# Patient Record
Sex: MALE | Race: WHITE | Employment: FULL TIME | URBAN - METROPOLITAN AREA
[De-identification: names, ages, dates, MRNs, and addresses within clinical notes are randomized per-mention and may not be internally consistent; named-entity substitution may affect disease eponyms.]

---

## 2017-09-09 ENCOUNTER — APPOINTMENT (OUTPATIENT)
Dept: GENERAL RADIOLOGY | Facility: CLINIC | Age: 47
End: 2017-09-09
Attending: EMERGENCY MEDICINE
Payer: COMMERCIAL

## 2017-09-09 ENCOUNTER — HOSPITAL ENCOUNTER (EMERGENCY)
Facility: CLINIC | Age: 47
Discharge: HOME | End: 2017-09-09
Attending: EMERGENCY MEDICINE | Admitting: EMERGENCY MEDICINE
Payer: COMMERCIAL

## 2017-09-09 VITALS
SYSTOLIC BLOOD PRESSURE: 110 MMHG | HEART RATE: 90 BPM | OXYGEN SATURATION: 98 % | DIASTOLIC BLOOD PRESSURE: 76 MMHG | TEMPERATURE: 97.7 F | RESPIRATION RATE: 20 BRPM

## 2017-09-09 DIAGNOSIS — R07.89 ATYPICAL CHEST PAIN: ICD-10-CM

## 2017-09-09 LAB
ANION GAP SERPL CALCULATED.3IONS-SCNC: 8 MMOL/L (ref 3–14)
BUN SERPL-MCNC: 18 MG/DL (ref 7–30)
CALCIUM SERPL-MCNC: 8.8 MG/DL (ref 8.5–10.1)
CHLORIDE SERPL-SCNC: 102 MMOL/L (ref 94–109)
CO2 SERPL-SCNC: 27 MMOL/L (ref 20–32)
CREAT SERPL-MCNC: 1.1 MG/DL (ref 0.66–1.25)
ERYTHROCYTE [DISTWIDTH] IN BLOOD BY AUTOMATED COUNT: 12.4 % (ref 10–15)
GFR SERPL CREATININE-BSD FRML MDRD: 71 ML/MIN/1.7M2
GLUCOSE SERPL-MCNC: 233 MG/DL (ref 70–99)
HCT VFR BLD AUTO: 43.6 % (ref 40–53)
HGB BLD-MCNC: 15.1 G/DL (ref 13.3–17.7)
MAGNESIUM SERPL-MCNC: 2.2 MG/DL (ref 1.6–2.3)
MCH RBC QN AUTO: 30.3 PG (ref 26.5–33)
MCHC RBC AUTO-ENTMCNC: 34.6 G/DL (ref 31.5–36.5)
MCV RBC AUTO: 88 FL (ref 78–100)
PLATELET # BLD AUTO: 299 10E9/L (ref 150–450)
POTASSIUM SERPL-SCNC: 4.1 MMOL/L (ref 3.4–5.3)
RBC # BLD AUTO: 4.98 10E12/L (ref 4.4–5.9)
SODIUM SERPL-SCNC: 137 MMOL/L (ref 133–144)
TROPONIN I SERPL-MCNC: <0.015 UG/L (ref 0–0.04)
TROPONIN I SERPL-MCNC: <0.015 UG/L (ref 0–0.04)
WBC # BLD AUTO: 6.6 10E9/L (ref 4–11)

## 2017-09-09 PROCEDURE — 85027 COMPLETE CBC AUTOMATED: CPT | Performed by: EMERGENCY MEDICINE

## 2017-09-09 PROCEDURE — 84484 ASSAY OF TROPONIN QUANT: CPT | Performed by: EMERGENCY MEDICINE

## 2017-09-09 PROCEDURE — 71020 XR CHEST 2 VW: CPT

## 2017-09-09 PROCEDURE — 93005 ELECTROCARDIOGRAM TRACING: CPT

## 2017-09-09 PROCEDURE — 25000132 ZZH RX MED GY IP 250 OP 250 PS 637: Performed by: EMERGENCY MEDICINE

## 2017-09-09 PROCEDURE — 80048 BASIC METABOLIC PNL TOTAL CA: CPT | Performed by: EMERGENCY MEDICINE

## 2017-09-09 PROCEDURE — 83735 ASSAY OF MAGNESIUM: CPT | Performed by: EMERGENCY MEDICINE

## 2017-09-09 PROCEDURE — 99285 EMERGENCY DEPT VISIT HI MDM: CPT | Mod: 25

## 2017-09-09 RX ORDER — ALUMINA, MAGNESIA, AND SIMETHICONE 2400; 2400; 240 MG/30ML; MG/30ML; MG/30ML
30 SUSPENSION ORAL ONCE
Status: COMPLETED | OUTPATIENT
Start: 2017-09-09 | End: 2017-09-09

## 2017-09-09 RX ORDER — ASPIRIN 81 MG/1
324 TABLET, CHEWABLE ORAL ONCE
Status: COMPLETED | OUTPATIENT
Start: 2017-09-09 | End: 2017-09-09

## 2017-09-09 RX ADMIN — ALUMINUM HYDROXIDE, MAGNESIUM HYDROXIDE, AND DIMETHICONE 30 ML: 400; 400; 40 SUSPENSION ORAL at 16:25

## 2017-09-09 RX ADMIN — ASPIRIN 81 MG 324 MG: 81 TABLET ORAL at 15:36

## 2017-09-09 ASSESSMENT — ENCOUNTER SYMPTOMS
CHILLS: 0
DYSURIA: 0
DIARRHEA: 0
CONSTIPATION: 0
FREQUENCY: 0
SHORTNESS OF BREATH: 0
DIFFICULTY URINATING: 0
FEVER: 0
NAUSEA: 0
PALPITATIONS: 0
HEMATURIA: 0
DIAPHORESIS: 0
ABDOMINAL PAIN: 0

## 2017-09-09 NOTE — ED AVS SNAPSHOT
[facility] Emergency Department    [address]    [facility] [address]    Phone:  [phone]    Fax:  [phone]                                       Jorge Alberto Daley   MRN: [id number]    Department:  [facility] Emergency Department   Date of Visit:  9/9/2017           Patient Information     Date Of Birth          1970        Your diagnoses for this visit were:     Atypical chest pain        You were seen by Luis Dee MD.      Follow-up Information     Follow up with Adam Feliciano MD. Schedule an appointment as soon as possible for a visit in 3 days.    Why:  For follow up        Follow up with [facility] [place]. Schedule an appointment as soon as possible for a visit in 2 days.    Why:  Appointments: [phone] or toll-free [phone]    Contact information:    [address]  [facility] [address]          Discharge Instructions       YOUR TESTING IN THE ED WAS NORMAL HOWEVER YOU SHOULD STILL HAVE A CARDIAC STRESS TEST IN 2-3 DAYS. THE ORDERS FOR THIS WERE PLACED PRIOR TO YOU LEAVING THE ED. THE CARDIOLOGY GROUP WILL CALL YOU TO SCHEDULE THE STRESS TEST. IF THEY DO NOT CONTACT YOU, YOU MAY CALL THEM TO MAKE ARRANGEMENTS.     Discharge Instructions  Chest Pain    You have been seen today for chest pain or discomfort.  At this time, your provider has found no signs that your chest pain is due to a serious or life-threatening condition, (or you have declined more testing and/or admission to the hospital). However, sometimes there is a serious problem that does not show up right away. Your evaluation today may not be complete and you may need further testing and evaluation.     Generally, every Emergency Department visit should have a follow-up clinic visit with either a primary or a specialty clinic/provider. Please follow-up as instructed by your emergency provider today.  Return to the  Emergency Department if:    Your chest pain changes, gets worse, starts to happen more often, or comes with less activity.    You are newly short of breath.    You get very weak or tired.    You pass out or faint.    You have any new symptoms, like fever, cough, numb legs, or you cough up blood.    You have anything else that worries you.    Until you follow-up with your regular provider, please do the following:    Take one aspirin daily unless you have an allergy or are told not to by your provider.    If a stress test appointment has been made, go to the appointment.    If you have questions, contact your regular provider.    Follow-up with your regular provider/clinic as directed; this is very important.    If you were given a prescription for medicine here today, be sure to read all of the information (including the package insert) that comes with your prescription.  This will include important information about the medicine, its side effects, and any warnings that you need to know about.  The pharmacist who fills the prescription can provide more information and answer questions you may have about the medicine.  If you have questions or concerns that the pharmacist cannot address, please call or return to the Emergency Department.       Remember that you can always come back to the Emergency Department if you are not able to see your regular provider in the amount of time listed above, if you get any new symptoms, or if there is anything that worries you.      24 Hour Appointment Hotline       To make an appointment at any [facility] [place], call [phone] ([phone]). If you don't have a family doctor or clinic, we will help you find one. [facility] are conveniently located to serve the needs of you and your family.          ED Discharge Orders     Exercise Stress Echocardiogram       Administration of IV contrast will be tailored to this examination per the appropriate written protocol listed  in the Echocardiography department Protocol Book, or by the supervising Cardiologist. This may result in an order change.    Use of contrast is at the discretion of the supervising Cardiologist.            Follow-Up with Cardiologist                    Review of your medicines      Our records show that you are taking the medicines listed below. If these are incorrect, please call your family doctor or clinic.        Dose / Directions Last dose taken    ALLEGRA-D OR        Take  by mouth.   Refills:  0        azithromycin 250 MG tablet   Commonly known as:  ZITHROMAX   Quantity:  6 tablet        Two tablets first day, then one tablet daily for four days.   Refills:  0        fish oil-omega-3 fatty acids 1000 MG capsule   Dose:  2 g        Take 2 g by mouth daily.   Refills:  0        fluticasone 50 MCG/ACT spray   Commonly known as:  FLONASE   Dose:  2 spray   Quantity:  1 Package        2 sprays by Both Nostrils route daily.   Refills:  0        INSULIN ASPART SC        Inject  Subcutaneous.   Refills:  0        LANTUS SOLOSTAR SC   Dose:  25 Units        Inject 25 Units Subcutaneous daily.   Refills:  0        Levothyroxine Sodium 100 MCG Caps        Take  by mouth.   Refills:  0        * order for DME   Quantity:  1 each        Equipment being ordered: FreeStyle Insulinx test strips.  Please dispense a 3 month supply   Refills:  11        * order for DME   Quantity:  1 each        Equipment being ordered: Free style Insulinx test strips  SIG: One test strip in vitro TID   Ok to give 3 month supply   Refills:  PRN        rosuvastatin 10 MG tablet   Commonly known as:  CRESTOR   Dose:  10 mg   Quantity:  90 tablet        Take 1 tablet by mouth daily.   Refills:  3        salicylic acid 40 % Misc   Commonly known as:  MEDIPLAST   Dose:  1 dose.   Quantity:  1 each        Apply 1 dose topically At Bedtime. Each night, Scrape or loofa the wart(s) and then soak foot for 10-15 min in warm water.  Cut plaster to fit  exactly over 1 wart each.  Tape each in place for overnight and remove the next morning.   Refills:  11        VITAMIN D (CHOLECALCIFEROL) PO        Take  by mouth daily.   Refills:  0        * Notice:  This list has 2 medication(s) that are the same as other medications prescribed for you. Read the directions carefully, and ask your doctor or other care provider to review them with you.            Procedures and tests performed during your visit     Procedure/Test Number of Times Performed    Basic metabolic panel (BMP) 1    CBC (platelets, no diff) 1    Cardiac Continuous Monitoring 1    EKG 12 lead 1    Magnesium 1    Peripheral IV: Standard 1    Pulse oximetry nursing 1    Troponin I (now) 2    XR Chest 2 Views 1      Orders Needing Specimen Collection     None      Pending Results     Date and Time Order Name Status Description    9/9/2017 1430 EKG 12 lead Preliminary             Pending Culture Results     No orders found from 9/7/2017 to 9/10/2017.            Pending Results Instructions     If you had any lab results that were not finalized at the time of your Discharge, you can call the ED Lab Result RN at [phone]. You will be contacted by this team for any positive Lab results or changes in treatment. The nurses are available 7 days a week from 10A to 6:30P.  You can leave a message 24 hours per day and they will return your call.        Test Results From Your Hospital Stay        9/9/2017  3:43 PM      Component Results     Component Value Ref Range & Units Status    Troponin I ES <0.015 0.000 - 0.045 ug/L Final    The 99th percentile for upper reference range is 0.045 ug/L.  Troponin values   in the range of 0.045 - 0.120 ug/L may be associated with risks of adverse   clinical events.           9/9/2017  3:24 PM      Component Results     Component Value Ref Range & Units Status    WBC 6.6 4.0 - 11.0 10e9/L Final    RBC Count 4.98 4.4 - 5.9 10e12/L Final    Hemoglobin 15.1 13.3 - 17.7 g/dL Final     Hematocrit 43.6 40.0 - 53.0 % Final    MCV 88 78 - 100 fl Final    MCH 30.3 26.5 - 33.0 pg Final    MCHC 34.6 31.5 - 36.5 g/dL Final    RDW 12.4 10.0 - 15.0 % Final    Platelet Count 299 150 - 450 10e9/L Final         9/9/2017  3:43 PM      Component Results     Component Value Ref Range & Units Status    Sodium 137 133 - 144 mmol/L Final    Potassium 4.1 3.4 - 5.3 mmol/L Final    Chloride 102 94 - 109 mmol/L Final    Carbon Dioxide 27 20 - 32 mmol/L Final    Anion Gap 8 3 - 14 mmol/L Final    Glucose 233 (H) 70 - 99 mg/dL Final    Urea Nitrogen 18 7 - 30 mg/dL Final    Creatinine 1.10 0.66 - 1.25 mg/dL Final    GFR Estimate 71 >60 mL/min/1.7m2 Final    Non  GFR Calc    GFR Estimate If Black 86 >60 mL/min/1.7m2 Final    African American GFR Calc    Calcium 8.8 8.5 - 10.1 mg/dL Final         9/9/2017  3:48 PM      Narrative     XR CHEST 2 VW   9/9/2017 3:44 PM     HISTORY: Chest pain    COMPARISON: None.    FINDINGS: Heart size normal. Lungs clear.        Impression     IMPRESSION: Negative.    KELVIN SUÁREZ MD         9/9/2017  3:52 PM      Component Results     Component Value Ref Range & Units Status    Magnesium 2.2 1.6 - 2.3 mg/dL Final         9/9/2017  6:19 PM      Component Results     Component Value Ref Range & Units Status    Troponin I ES <0.015 0.000 - 0.045 ug/L Final    The 99th percentile for upper reference range is 0.045 ug/L.  Troponin values   in the range of 0.045 - 0.120 ug/L may be associated with risks of adverse   clinical events.                  Clinical Quality Measure: Blood Pressure Screening     Your blood pressure was checked while you were in the emergency department today. The last reading we obtained was  BP: 110/74 . Please read the guidelines below about what these numbers mean and what you should do about them.  If your systolic blood pressure (the top number) is less than 120 and your diastolic blood pressure (the bottom number) is less than 80, then your  blood pressure is normal. There is nothing more that you need to do about it.  If your systolic blood pressure (the top number) is 120-139 or your diastolic blood pressure (the bottom number) is 80-89, your blood pressure may be higher than it should be. You should have your blood pressure rechecked within a year by a primary care provider.  If your systolic blood pressure (the top number) is 140 or greater or your diastolic blood pressure (the bottom number) is 90 or greater, you may have high blood pressure. High blood pressure is treatable, but if left untreated over time it can put you at risk for heart attack, stroke, or kidney failure. You should have your blood pressure rechecked by a primary care provider within the next 4 weeks.  If your provider in the emergency department today gave you specific instructions to follow-up with your doctor or provider even sooner than that, you should follow that instruction and not wait for up to 4 weeks for your follow-up visit.        Thank you for choosing [facility]       Thank you for choosing [facility] for your care. Our goal is always to provide you with excellent care. Hearing back from our patients is one way we can continue to improve our services. Please take a few minutes to complete the written survey that you may receive in the mail after you visit with us. Thank you!        [facility] Information     [employer] gives you secure access to your electronic health record. If you see a primary care provider, you can also send messages to your care team and make appointments. If you have questions, please call your primary care clinic.  If you do not have a primary care provider, please call [phone] and they will assist you.        Care EveryWhere ID     This is your Care EveryWhere ID. This could be used by other organizations to access your [facility] medical records  [id number]        Equal Access to Services     ABIGAIL GARCIA AH: leonardo López  camila gee waxay idiin hayaan adeeg kharash la'aan ah. So [facility] [phone].    ATENCIÓN: Si habla tesha, tiene a dewey disposición servicios gratuitos de asistencia lingüística. Llame al [phone].    We comply with applicable federal civil rights laws and [place] laws. We do not discriminate on the basis of race, color, national origin, age, disability sex, sexual orientation or gender identity.            After Visit Summary       This is your record. Keep this with you and show to your community pharmacist(s) and doctor(s) at your next visit.

## 2017-09-09 NOTE — DISCHARGE INSTRUCTIONS
YOUR TESTING IN THE ED WAS NORMAL HOWEVER YOU SHOULD STILL HAVE A CARDIAC STRESS TEST IN 2-3 DAYS. THE ORDERS FOR THIS WERE PLACED PRIOR TO YOU LEAVING THE ED. THE CARDIOLOGY GROUP WILL CALL YOU TO SCHEDULE THE STRESS TEST. IF THEY DO NOT CONTACT YOU, YOU MAY CALL THEM TO MAKE ARRANGEMENTS.     Discharge Instructions  Chest Pain    You have been seen today for chest pain or discomfort.  At this time, your provider has found no signs that your chest pain is due to a serious or life-threatening condition, (or you have declined more testing and/or admission to the hospital). However, sometimes there is a serious problem that does not show up right away. Your evaluation today may not be complete and you may need further testing and evaluation.     Generally, every Emergency Department visit should have a follow-up clinic visit with either a primary or a specialty clinic/provider. Please follow-up as instructed by your emergency provider today.  Return to the Emergency Department if:    Your chest pain changes, gets worse, starts to happen more often, or comes with less activity.    You are newly short of breath.    You get very weak or tired.    You pass out or faint.    You have any new symptoms, like fever, cough, numb legs, or you cough up blood.    You have anything else that worries you.    Until you follow-up with your regular provider, please do the following:    Take one aspirin daily unless you have an allergy or are told not to by your provider.    If a stress test appointment has been made, go to the appointment.    If you have questions, contact your regular provider.    Follow-up with your regular provider/clinic as directed; this is very important.    If you were given a prescription for medicine here today, be sure to read all of the information (including the package insert) that comes with your prescription.  This will include important information about the medicine, its side effects, and any warnings  that you need to know about.  The pharmacist who fills the prescription can provide more information and answer questions you may have about the medicine.  If you have questions or concerns that the pharmacist cannot address, please call or return to the Emergency Department.       Remember that you can always come back to the Emergency Department if you are not able to see your regular provider in the amount of time listed above, if you get any new symptoms, or if there is anything that worries you.

## 2017-09-09 NOTE — ED AVS SNAPSHOT
[facility] Emergency Department    [address]    [facility] [address]    Phone:  [phone]    Fax:  [phone]                                       Jorge Alberto Daley   MRN: [id number]    Department:  [facility] Emergency Department   Date of Visit:  9/9/2017           After Visit Summary Signature Page     I have received my discharge instructions, and my questions have been answered. I have discussed any challenges I see with this plan with the nurse or doctor.    ..........................................................................................................................................  Patient/Patient Representative Signature      ..........................................................................................................................................  Patient Representative Print Name and Relationship to Patient    ..................................................               ................................................  Date                                            Time    ..........................................................................................................................................  Reviewed by Signature/Title    ...................................................              ..............................................  Date                                                            Time

## 2017-09-09 NOTE — ED PROVIDER NOTES
History     Chief Complaint:  Chest Pain    HPI   Jorge Alberto Daley is a 47 year old male who presents with non radiating, dull chest pain. Severity is mild 1-2 out of 10. The patient states he noted the sensation last night around 8 pm located over the heart area. He had his first beer in 6 months, ate dinner, and went to a concert. The pain is constant, not positional or worsened by exertion. It is not tender to palpation. The patient denies shortness of breath, nausea, diaphoresis, trauma, fevers, colds, abdominal pain, changes in bathroom habits, leg swelling, and states no other concerns at this time. The pain is not pleuritic. Does not worsen with exertion. Denies trauma/injury.      Cardiac/PE/DVT Risk Factors:  The patient has no history of hypertension or hyperlipidemia, but is a type 1 diabetic. He is not a smoker. He denies any personal or familial history of CAD, PE, DVT or clotting disorder.  He reports no recent travel, surgery or other immobilizations.  He is not on hormone therapy and has no known malignancy.    Allergies:  No known drug allergies.      Medications:    azithromycin (ZITHROMAX) 250 MG tablet  Levothyroxine Sodium 100 MCG CAPS  Insulin Glargine (LANTUS SOLOSTAR SC)  salicylic acid (MEDIPLAST) 40 % MISC  fish oil-omega-3 fatty acids (FISH OIL) 1000 MG capsule  VITAMIN D, CHOLECALCIFEROL, PO  rosuvastatin (CRESTOR) 10 MG tablet  INSULIN ASPART SC  Fexofenadine-Pseudoephedrine (ALLEGRA-D OR)  fluticasone (FLONASE) 50 MCG/ACT nasal spray    Past Medical History:    Diabetes  Thyroid disease    Past Surgical History:    Appendectomy   Cataract right eye    Family History:    Endocrine disease  Hypertension     Social History:  Marital Status:     Smoking status: Never smoker  Alcohol use: Yes     Review of Systems   Constitutional: Negative for chills, diaphoresis and fever.   Respiratory: Negative for shortness of breath.    Cardiovascular: Positive for chest pain. Negative for  palpitations and leg swelling.   Gastrointestinal: Negative for abdominal pain, constipation, diarrhea and nausea.   Genitourinary: Negative for difficulty urinating, dysuria, frequency, hematuria and urgency.   All other systems reviewed and are negative.    Physical Exam     Patient Vitals for the past 24 hrs:   BP Temp Temp src Pulse Heart Rate Resp SpO2   09/09/17 1554 - - - - - - 98 %   09/09/17 1553 110/70 - - - - - 96 %   09/09/17 1536 - - - - 84 - 97 %   09/09/17 1535 115/79 - - - 80 - 94 %   09/09/17 1520 120/84 - - - 90 - 96 %   09/09/17 1502 - - - - 83 - 93 %   09/09/17 1501 - - - - 77 - 98 %   09/09/17 1500 - - - - 75 - 97 %   09/09/17 1431 123/80 97.7  F (36.5  C) Oral 90 - 20 100 %      Physical Exam  General: Resting comfortably  Head:  Scalp, face, and head appear normal  Eyes:  Pupils are equal, round, and reactive to light    No nystagmus    Conjunctivae non-injected and sclerae white  ENT:    The external nose is normal    Pinnae are normal    The oropharynx is normal, mucous membranes moist    Uvula is in the midline  Neck:  Normal range of motion    There is no rigidity noted    Trachea is in the midline  CV:  Regular rate and rhythm     Normal S1/S2, no S3/S4    No murmur or rub    Chest pain not reproducible with palpation  Resp:  Lungs are clear and equal bilaterally    There is no tachypnea    No increased work of breathing    No rales, wheezing, or rhonchi  GI:  Abdomen is soft, no rigidity or guarding    No distension, or mass    No rebound tenderness   MS:  Normal muscular tone    Symmetric motor strength    No lower extremity edema  Skin:  No rash or acute skin lesions noted  Neuro:  Awake and alert    Speech is normal and fluent    Moves all extremities spontaneously  Psych: Normal affect.  Appropriate interactions.    Emergency Department Course     ECG:  @ 1435  Indication: Chest pain   Vent. Rate 84 bpm. PA interval 144 ms. QRS duration 88 ms. QT/QTc 340/401 ms. P-R-T axis 72 59 53.    Abnormal ECG.  Normal sinus rhythm   Read @ 1514 by Dr. Dee.     Imaging:  Radiology findings were communicated with the patient who voiced understanding of the findings.  XR Chest 2 Views   Final Result   IMPRESSION: Negative.      KELVIN SUÁREZ MD         Laboratory:  Laboratory findings were communicated with the patient who voiced understanding of the findings.  Troponin (Collected 1430): <0.015  Troponin (Collected 1742): <0.015  CBC: AWNL. (WBC 6.6, HGB 15.1, )   BMP: Glucose 233 (H), o/w WNL (Creatinine 1.10)   Magnesium: 2.2      Interventions:  1536: Asprin 324 mg PO  1625: Maalox 30 mL PO  Medications   aspirin chewable tablet 324 mg (324 mg Oral Given 9/9/17 1536)   alum & mag hydroxide-simethicone (MYLANTA ES/MAALOX  ES) suspension 30 mL (30 mLs Oral Given 9/9/17 1625)        Emergency Department Course:  Nursing notes and vitals reviewed.  I performed an exam of the patient as documented above.   The patient was sent for a chest x-ray while in the emergency department, results above.   IV was inserted and blood was drawn for laboratory testing, results above.   I discussed the treatment plan with the patient. They expressed understanding of this plan and consented to discharge. They will be discharged home with instructions for care and follow up. In addition, the patient will return to the emergency department if their symptoms persist, worsen, if new symptoms arise or if there is any concern.  All questions were answered.    I personally reviewed the laboratory and imaging results with the Patient and answered all related questions prior to discharge.     Impression & Plan      Medical Decision Making:   Jorge Alberto Daley is a 47 year old male with a history of type one diabtes who presents for evaluation of low magnitude chest pain which is described as tightness. There are no concerning associated symptoms. The patient is PERC negative. The patient has a heart score of 2 for risk factors.  Symptoms are fairly atypical for acute coronary syndrome, however this does remain on the differential. Others include pneumothorax, GERD, gastritis, pleural effusion, or musculoskeletal pain. The patient takes regular baby Asprin. We will give him a full dose Asprin in the emergency department. Plan for two troponins. If negative, would pursue outpatient stress test and follow up with PCP and cardiology. Will also try some Maalox for systematic relief. Patient's EKG is normal with no evidence of acute ischemic changes.     After ASA and Maalox in the ED the patient's symptoms completely resolved. Troponins neg x2. Set up orders for outpatient stress test and cardiology follow up. Results were discussed with the patient. Recommended maalox/gaviscon/zantac at home if symptoms recurr or to return to the ED if he has SOB, severe CP, exertional pain, or other concerning symptoms. Return precautions were discussed with patient. The patient's questions were answered and the patient was agreeable with discharge.      Diagnosis:    ICD-10-CM    1. Atypical chest pain R07.89 Follow-Up with Cardiologist     Exercise Stress Echocardiogram      Disposition:   Discharged home with PCP and cardiology follow up    Discharge Medications:  Discharge Medication List as of 9/9/2017  6:49 PM        Scribe Disclosure:  I, Ino Morris, am serving as a scribe at 3:16 PM on 9/9/2017 to document services personally performed by Luis Dee MD, based on my observations and the provider's statements to me.   9/9/2017   [facility] EMERGENCY DEPARTMENT       Luis Dee MD  09/09/17 2527

## 2017-09-09 NOTE — ED NOTES
"Patient arrives complaining of chest \"tightness\" in his left chest that has been there constantly since last evening. He denies SOB or any other complaints at this time. He is alert and oriented, ABCs intact.  "

## 2017-09-10 LAB — INTERPRETATION ECG - MUSE: NORMAL

## 2017-09-12 ENCOUNTER — HOSPITAL ENCOUNTER (OUTPATIENT)
Dept: CARDIOLOGY | Facility: CLINIC | Age: 47
Discharge: HOME | End: 2017-09-12
Attending: EMERGENCY MEDICINE | Admitting: EMERGENCY MEDICINE
Payer: COMMERCIAL

## 2017-09-12 DIAGNOSIS — R07.89 ATYPICAL CHEST PAIN: ICD-10-CM

## 2017-09-12 PROCEDURE — 93018 CV STRESS TEST I&R ONLY: CPT | Performed by: INTERNAL MEDICINE

## 2017-09-12 PROCEDURE — 93016 CV STRESS TEST SUPVJ ONLY: CPT | Performed by: INTERNAL MEDICINE

## 2017-09-12 PROCEDURE — 25500064 ZZH RX 255 OP 636: Performed by: EMERGENCY MEDICINE

## 2017-09-12 PROCEDURE — 93325 DOPPLER ECHO COLOR FLOW MAPG: CPT | Mod: 26 | Performed by: INTERNAL MEDICINE

## 2017-09-12 PROCEDURE — 93321 DOPPLER ECHO F-UP/LMTD STD: CPT | Mod: 26 | Performed by: INTERNAL MEDICINE

## 2017-09-12 PROCEDURE — 40000264 ECHO STRESS TEST WITH LUMASON

## 2017-09-12 PROCEDURE — 93350 STRESS TTE ONLY: CPT | Mod: 26 | Performed by: INTERNAL MEDICINE

## 2017-09-12 RX ADMIN — SULFUR HEXAFLUORIDE 3 ML: KIT at 13:15

## 2019-10-16 ENCOUNTER — THERAPY VISIT (OUTPATIENT)
Dept: PHYSICAL THERAPY | Facility: CLINIC | Age: 49
End: 2019-10-16
Payer: COMMERCIAL

## 2019-10-16 DIAGNOSIS — M25.511 SHOULDER PAIN, RIGHT: ICD-10-CM

## 2019-10-16 PROCEDURE — 99207 C STAT DRY NEEDLING - IAM: CPT | Mod: 25 | Performed by: PHYSICAL THERAPIST

## 2019-10-16 PROCEDURE — 97161 PT EVAL LOW COMPLEX 20 MIN: CPT | Mod: GP | Performed by: PHYSICAL THERAPIST

## 2019-10-16 PROCEDURE — 97112 NEUROMUSCULAR REEDUCATION: CPT | Mod: GP | Performed by: PHYSICAL THERAPIST

## 2019-10-16 NOTE — PROGRESS NOTES
[facility] Initial Evaluation  Subjective:  The history is provided by the patient. No  was used.   JorgeA lberto Daley being seen for right shoulder pain .   Where condition occurred: for unknown reasons.Problem occurred: pain started about 2-3 months ago insidioulsy  and reported as 6/10 on pain scale. General health as reported by patient is good. Pertinent medical history includes:  Diabetes, thyroid problems and numbness/tingling.    Surgeries include:  Other. Other surgery history details: Appendix and Cataract.  Current medications:  Thyroid medication and other. Other medications details: Insulin, Statin, Asprin.   Primary job tasks include:  Computer work and prolonged sitting.  Pain is described as aching and is intermittent.  Since onset symptoms are gradually worsening.  Previous treatment includes chiropractic. There was moderate improvement following previous treatment.   Patient . Restrictions include:  Working in normal job without restrictions.    Barriers include:  None as reported by patient.  Red flags:  Pain at rest/night.  Type of problem:  Right shoulder   Condition occurred with:  Unknown cause.    Problem details: Left hand dominant.   Patient reports pain:  Scapular area. Radiates to:  Upper arm and lower arm (numbness into middle finger). Associated symptoms:  Numbness, tingling and loss of strength. Symptoms are exacerbated by lying on extremity, using arm at shoulder level, using arm overhead, carrying and lifting (computer use, lying down) Relieved by: massage, stretch.                      Objective:  Standing Alignment:    Cervical/Thoracic:  Forward head  Shoulder/UE:  Rounded shoulders and depressed scapula R                                       Shoulder Evaluation:  ROM:  AROM:  normal                              Pain: pain with overhead flexion and abduction    Strength:    Flexion: Left:5/5   Pain:    Right: 4+/5      Pain:   +  Extension:  Left: 4+/5    Pain:    Right: 4/5    Pain:  Abduction:  Left: 5/5  Pain:    Right: 5/5      Pain:+    Internal Rotation:  Left:5/5     Pain:    Right: 5/5     Pain:  External Rotation:   Left:5/5     Pain:   Right:4+/5      Pain:+        Elbow Flexion:  Left:5/5     Pain:    Right:5/5     Pain:  Elbow Extension:  Left:5/5     Pain:    Right:5/5     Pain:  Stability Testing:  normal      Special Tests:      Right shoulder positive for the following special tests:Impingement  Right shoulder negative for the following special tests:Labral and Neural Tension  Palpation:      Right shoulder tenderness present at: Infraspinatus; Levator and Rhomboids                                     General     ROS    Assessment/Plan:    Patient is a 49 year old male with right side shoulder complaints.    Patient has the following significant findings with corresponding treatment plan.                Diagnosis 1:  Right shoulder pain with radicular symptoms  Pain -  hot/cold therapy, manual therapy, self management, education, directional preference exercise, home program and FDN  Decreased ROM/flexibility - manual therapy, therapeutic exercise, therapeutic activity and home program  Decreased strength - therapeutic exercise, therapeutic activities and home program  Impaired muscle performance - neuro re-education and home program  Decreased function - therapeutic activities and home program  Impaired posture - neuro re-education, therapeutic activities and home program    Cumulative Therapy Evaluation is: Low complexity.    Previous and current functional limitations:  (See Goal Flow Sheet for this information)    Short term and Long term goals: (See Goal Flow Sheet for this information)     Communication ability:  Patient appears to be able to clearly communicate and understand verbal and written communication and follow directions correctly.  Treatment Explanation - The following has been discussed with the patient:    RX ordered/plan of care  Anticipated outcomes  Possible risks and side effects  This patient would benefit from PT intervention to resume normal activities.   Rehab potential is good.    Frequency:  1 X week, once daily  Duration:  for 8 weeks  Discharge Plan:  Achieve all LTG.  Independent in home treatment program.  Reach maximal therapeutic benefit.    Please refer to the daily flowsheet for treatment today, total treatment time and time spent performing 1:1 timed codes.

## 2019-10-18 ENCOUNTER — THERAPY VISIT (OUTPATIENT)
Dept: PHYSICAL THERAPY | Facility: CLINIC | Age: 49
End: 2019-10-18
Payer: COMMERCIAL

## 2019-10-18 DIAGNOSIS — M25.511 SHOULDER PAIN, RIGHT: ICD-10-CM

## 2019-11-01 ENCOUNTER — THERAPY VISIT (OUTPATIENT)
Dept: PHYSICAL THERAPY | Facility: CLINIC | Age: 49
End: 2019-11-01
Payer: COMMERCIAL

## 2019-11-01 DIAGNOSIS — M25.511 SHOULDER PAIN, RIGHT: ICD-10-CM

## 2019-11-06 ENCOUNTER — HEALTH MAINTENANCE LETTER (OUTPATIENT)
Age: 49
End: 2019-11-06

## 2020-01-02 PROBLEM — M25.511 SHOULDER PAIN, RIGHT: Status: RESOLVED | Noted: 2019-10-16 | Resolved: 2020-01-02

## 2020-01-02 NOTE — PROGRESS NOTES
Subjective:  HPI                    Objective:  System    Physical Exam    General     ROS    Assessment/Plan:    DISCHARGE REPORT    Progress reporting period is from 10/16/19 to 11/1/19.       SUBJECTIVE  Subjective changes noted by patient: Patient states that he felt a lot better for over a week and then Monday and Tuesday started to have increase in pain again with sleeping where he will wake up about 1-2 AM.          OBJECTIVE  Objective: increased tension in R levator, teres and UT.  pt tolerated FDN with no adverse effects     ASSESSMENT/PLAN  Updated problem list and treatment plan: Diagnosis 1:  Right shoulder pain with radicular symptoms    STG/LTGs have been met or progress has been made towards goals:  Yes (See Goal flow sheet completed today.)  Assessment of Progress: The patient has not returned to therapy. Current status is unknown.    Recommendations:  Patient will be DC from PT    Please refer to the daily flowsheet for treatment today, total treatment time and time spent performing 1:1 timed codes.

## 2020-11-29 ENCOUNTER — HEALTH MAINTENANCE LETTER (OUTPATIENT)
Age: 50
End: 2020-11-29

## 2021-06-05 ENCOUNTER — HEALTH MAINTENANCE LETTER (OUTPATIENT)
Age: 51
End: 2021-06-05

## 2021-09-19 ENCOUNTER — HEALTH MAINTENANCE LETTER (OUTPATIENT)
Age: 51
End: 2021-09-19

## 2022-01-09 ENCOUNTER — HEALTH MAINTENANCE LETTER (OUTPATIENT)
Age: 52
End: 2022-01-09

## 2022-08-21 ENCOUNTER — HEALTH MAINTENANCE LETTER (OUTPATIENT)
Age: 52
End: 2022-08-21

## 2022-11-21 ENCOUNTER — HEALTH MAINTENANCE LETTER (OUTPATIENT)
Age: 52
End: 2022-11-21

## 2023-04-16 ENCOUNTER — HEALTH MAINTENANCE LETTER (OUTPATIENT)
Age: 53
End: 2023-04-16

## 2023-11-26 ENCOUNTER — HEALTH MAINTENANCE LETTER (OUTPATIENT)
Age: 53
End: 2023-11-26